# Patient Record
Sex: MALE | Race: WHITE
[De-identification: names, ages, dates, MRNs, and addresses within clinical notes are randomized per-mention and may not be internally consistent; named-entity substitution may affect disease eponyms.]

---

## 2018-06-25 ENCOUNTER — HOSPITAL ENCOUNTER (INPATIENT)
Dept: HOSPITAL 88 - ER | Age: 53
Discharge: HOME | DRG: 690 | End: 2018-06-25
Attending: INTERNAL MEDICINE | Admitting: INTERNAL MEDICINE
Payer: COMMERCIAL

## 2018-06-25 VITALS — WEIGHT: 280 LBS | BODY MASS INDEX: 37.11 KG/M2 | HEIGHT: 73 IN

## 2018-06-25 VITALS — SYSTOLIC BLOOD PRESSURE: 139 MMHG | DIASTOLIC BLOOD PRESSURE: 67 MMHG

## 2018-06-25 VITALS — DIASTOLIC BLOOD PRESSURE: 60 MMHG | SYSTOLIC BLOOD PRESSURE: 132 MMHG

## 2018-06-25 VITALS — DIASTOLIC BLOOD PRESSURE: 67 MMHG | SYSTOLIC BLOOD PRESSURE: 137 MMHG

## 2018-06-25 VITALS — DIASTOLIC BLOOD PRESSURE: 67 MMHG | SYSTOLIC BLOOD PRESSURE: 139 MMHG

## 2018-06-25 DIAGNOSIS — N43.40: ICD-10-CM

## 2018-06-25 DIAGNOSIS — Z28.21: ICD-10-CM

## 2018-06-25 DIAGNOSIS — N13.6: Primary | ICD-10-CM

## 2018-06-25 DIAGNOSIS — I10: ICD-10-CM

## 2018-06-25 DIAGNOSIS — E78.00: ICD-10-CM

## 2018-06-25 DIAGNOSIS — Z86.718: ICD-10-CM

## 2018-06-25 DIAGNOSIS — Z89.612: ICD-10-CM

## 2018-06-25 DIAGNOSIS — G47.30: ICD-10-CM

## 2018-06-25 DIAGNOSIS — K80.20: ICD-10-CM

## 2018-06-25 DIAGNOSIS — Q62.8: ICD-10-CM

## 2018-06-25 LAB
ALBUMIN SERPL-MCNC: 4.4 G/DL (ref 3.5–5)
ALBUMIN/GLOB SERPL: 1.5 {RATIO} (ref 0.8–2)
ALP SERPL-CCNC: 74 IU/L (ref 40–150)
ALT SERPL-CCNC: 44 IU/L (ref 0–55)
AMYLASE SERPL-CCNC: 66 U/L (ref 25–125)
ANION GAP SERPL CALC-SCNC: 13.6 MMOL/L (ref 8–16)
BACTERIA URNS QL MICRO: (no result) /HPF
BASOPHILS # BLD AUTO: 0 10*3/UL (ref 0–0.1)
BASOPHILS NFR BLD AUTO: 0.4 % (ref 0–1)
BILIRUB UR QL: NEGATIVE
BUN SERPL-MCNC: 22 MG/DL (ref 7–26)
BUN/CREAT SERPL: 18 (ref 6–25)
CALCIUM SERPL-MCNC: 9.8 MG/DL (ref 8.4–10.2)
CHLORIDE SERPL-SCNC: 104 MMOL/L (ref 98–107)
CK MB SERPL-MCNC: 2 NG/ML (ref 0–5)
CK MB SERPL-MCNC: 3.5 NG/ML (ref 0–5)
CK SERPL-CCNC: 199 IU/L (ref 30–200)
CK SERPL-CCNC: 343 IU/L (ref 30–200)
CLARITY UR: CLEAR
CO2 SERPL-SCNC: 27 MMOL/L (ref 22–29)
COLOR UR: YELLOW
DEPRECATED APTT PLAS QN: 27.1 SECONDS (ref 23.8–35.5)
DEPRECATED INR PLAS: 1.09
DEPRECATED NEUTROPHILS # BLD AUTO: 7.1 10*3/UL (ref 2.1–6.9)
DEPRECATED RBC URNS MANUAL-ACNC: (no result) /HPF (ref 0–5)
EGFRCR SERPLBLD CKD-EPI 2021: > 60 ML/MIN (ref 60–?)
EOSINOPHIL # BLD AUTO: 0.1 10*3/UL (ref 0–0.4)
EOSINOPHIL NFR BLD AUTO: 1.4 % (ref 0–6)
EPI CELLS URNS QL MICRO: (no result) /LPF
ERYTHROCYTE [DISTWIDTH] IN CORD BLOOD: 12.7 % (ref 11.7–14.4)
GLOBULIN PLAS-MCNC: 2.9 G/DL (ref 2.3–3.5)
GLUCOSE SERPLBLD-MCNC: 140 MG/DL (ref 74–118)
HCT VFR BLD AUTO: 40.9 % (ref 38.2–49.6)
HGB BLD-MCNC: 14.5 G/DL (ref 14–18)
KETONES UR QL STRIP.AUTO: NEGATIVE
LEUKOCYTE ESTERASE UR QL STRIP.AUTO: NEGATIVE
LIPASE SERPL-CCNC: 36 U/L (ref 8–78)
LYMPHOCYTES # BLD: 1.9 10*3/UL (ref 1–3.2)
LYMPHOCYTES NFR BLD AUTO: 19.7 % (ref 18–39.1)
MAGNESIUM SERPL-MCNC: 2 MG/DL (ref 1.3–2.1)
MCH RBC QN AUTO: 29.8 PG (ref 28–32)
MCHC RBC AUTO-ENTMCNC: 35.5 G/DL (ref 31–35)
MCV RBC AUTO: 84.2 FL (ref 81–99)
MONOCYTES # BLD AUTO: 0.7 10*3/UL (ref 0.2–0.8)
MONOCYTES NFR BLD AUTO: 6.7 % (ref 4.4–11.3)
NEUTS SEG NFR BLD AUTO: 71.7 % (ref 38.7–80)
NITRITE UR QL STRIP.AUTO: NEGATIVE
PLATELET # BLD AUTO: 159 X10E3/UL (ref 140–360)
POTASSIUM SERPL-SCNC: 3.6 MMOL/L (ref 3.5–5.1)
PROT UR QL STRIP.AUTO: (no result)
PROTHROMBIN TIME: 13.3 SECONDS (ref 11.9–14.5)
RBC # BLD AUTO: 4.86 X10E6/UL (ref 4.3–5.7)
SODIUM SERPL-SCNC: 141 MMOL/L (ref 136–145)
SP GR UR STRIP: 1.01 (ref 1.01–1.02)
UROBILINOGEN UR STRIP-MCNC: 0.2 MG/DL (ref 0.2–1)
WBC #/AREA URNS HPF: (no result) /HPF (ref 0–5)

## 2018-06-25 PROCEDURE — 36415 COLL VENOUS BLD VENIPUNCTURE: CPT

## 2018-06-25 PROCEDURE — 93005 ELECTROCARDIOGRAM TRACING: CPT

## 2018-06-25 PROCEDURE — 83690 ASSAY OF LIPASE: CPT

## 2018-06-25 PROCEDURE — 84484 ASSAY OF TROPONIN QUANT: CPT

## 2018-06-25 PROCEDURE — 81001 URINALYSIS AUTO W/SCOPE: CPT

## 2018-06-25 PROCEDURE — 85025 COMPLETE CBC W/AUTO DIFF WBC: CPT

## 2018-06-25 PROCEDURE — 82150 ASSAY OF AMYLASE: CPT

## 2018-06-25 PROCEDURE — BT141ZZ FLUOROSCOPY OF KIDNEYS, URETERS AND BLADDER USING LOW OSMOLAR CONTRAST: ICD-10-PCS | Performed by: UROLOGY

## 2018-06-25 PROCEDURE — 80053 COMPREHEN METABOLIC PANEL: CPT

## 2018-06-25 PROCEDURE — 83735 ASSAY OF MAGNESIUM: CPT

## 2018-06-25 PROCEDURE — 71045 X-RAY EXAM CHEST 1 VIEW: CPT

## 2018-06-25 PROCEDURE — 82553 CREATINE MB FRACTION: CPT

## 2018-06-25 PROCEDURE — 74420 UROGRAPHY RTRGR +-KUB: CPT

## 2018-06-25 PROCEDURE — 85730 THROMBOPLASTIN TIME PARTIAL: CPT

## 2018-06-25 PROCEDURE — 74176 CT ABD & PELVIS W/O CONTRAST: CPT

## 2018-06-25 PROCEDURE — 82550 ASSAY OF CK (CPK): CPT

## 2018-06-25 PROCEDURE — 85610 PROTHROMBIN TIME: CPT

## 2018-06-25 PROCEDURE — 0T788DZ DILATION OF BILATERAL URETERS WITH INTRALUMINAL DEVICE, VIA NATURAL OR ARTIFICIAL OPENING ENDOSCOPIC: ICD-10-PCS | Performed by: UROLOGY

## 2018-06-25 PROCEDURE — 99284 EMERGENCY DEPT VISIT MOD MDM: CPT

## 2018-06-25 PROCEDURE — 87086 URINE CULTURE/COLONY COUNT: CPT

## 2018-06-25 RX ADMIN — SODIUM CHLORIDE SCH MLS/HR: 9 INJECTION, SOLUTION INTRAVENOUS at 16:59

## 2018-06-25 RX ADMIN — SODIUM CHLORIDE SCH MLS/HR: 9 INJECTION, SOLUTION INTRAVENOUS at 07:14

## 2018-06-25 NOTE — DIAGNOSTIC IMAGING REPORT
EXAM: CT ABDOMEN/PELVIS WO

DATE: 6/25/2018 3:15 AM  

INDICATION:  Right flank pain, stone protocol

COMPARISON:  None 

TECHNIQUE: The abdomen and pelvis were scanned using a multidetector helical

scanner. Coronal and sagittal reformations were obtained. Routine protocol

performed.

IV Contrast: 0 ml Isovue 300/370



FINDINGS:

Lack of IV contrast decreases sensitivity in evaluating abdominal and pelvic

organs.

LOWER THORAX: No consolidations



LIVER/BILIARY:  No masses.  No ductal dilatation.



GALLBLADDER: Cholelithiasis.

SPLEEN: Unremarkable

PANCREAS: Unremarkable



ADRENALS: No nodules

KIDNEYS: No renal calculi. A 6 to 7 mm distal right ureteral calculus just

above the UVJ results in moderate hydroureteronephrosis. There is marked right

perinephric and periureteral fluid. A 4.8 mm stone is seen in the distal left

ureter without hydronephrosis.



GI TRACT: No evidence of bowel obstruction or appendicitis. Diverticulosis.



VESSELS: A infrarenal IVC filter is noted with extrusion of several struts

outside the expected lumen.

PERITONEUM/RETROPERITONEUM: No free air or fluid

LYMPH NODES: No lymphadenopathy



REPRODUCTIVE ORGANS/BLADDER: Unremarkable

BONES: Postsurgical changes with pubic symphysis and left SI joint ORIF and

partially imaged right femoral hardware.



IMPRESSION:



1. Right distal ureteral calculus (6-7 mm) with moderate hydroureteronephrosis.

The degree of perinephric fluid and inflammation raises the possibility of

calyceal rupture or superimposed infection. 

2. Left distal ureteral calculus (4.8 mm) without hydronephrosis.



Signed by: Dr Suly Williamson MD on 6/25/2018 4:18 AM

## 2018-06-25 NOTE — DISCHARGE SUMMARY
Patient is a 52-year-old male with a past medical history positive for 

hypertension came here with bilateral kidney stones, status post bilateral 

stent placement.  Patient going home today if okay with Dr. Moore, 

urology.  He is to see Dr. Moore in a week.



PHYSICAL EXAMINATION

HEART:  Shows regular rhythm.  Normal S1 and S2 sounds.  

LUNGS:  Clear bilaterally.  

ABDOMEN:  Soft.  

EXTREMITIES:  Shows a prosthesis on the left leg. 

VITALS:  Blood pressure 132/60, temperature is 96.2, heart rate 78 per 

minute, respiratory rate 16 per minute, oxygen saturation 94%.  



FINAL IMPRESSION 

1.  Bilateral kidney stones, status post bilateral stent placement.  

2.  Hypertension.  



PLAN OF TREATMENT:  Patient is going to discharged on Cipro 500 mg twice a 

day for 7-10 days.  Follow up with Dr. Moore, urologist, in a week.  The 

patient is to come to the ER should the symptoms worsen.



 



 _________________________________

RANDI AMADOR MD



DD:  06/25/2018 17:55

DT:  06/25/2018 17:59

Job#:  [id number]
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
## 2018-06-25 NOTE — DIAGNOSTIC IMAGING REPORT
CHEST SINGLE (PORTABLE), 6/25/2018 3:15 AM



Technique: CHEST SINGLE (PORTABLE)

Comparison: None available.

Clinical history: \S\ABD PAIN, N/V \[id number] \S\0338



Findings:

Limited portable technique with overpenetration. Normal cardiomediastinal

silhouette. No consolidation, pleural effusion or pneumothorax.



Impression:

Limited study without acute abnormality.



Signed by: Dr Suly Williamson MD on 6/25/2018 4:07 AM

## 2018-06-25 NOTE — OPERATIVE REPORT
DATE OF PROCEDURE:  June 25, 2018 

 

PREOPERATIVE DIAGNOSIS:

1. Bilateral ureterolithiasis.

2. Right hydroureteronephrosis.

3. Microhematuria.

4. Urinary tract infections.



POSTOPERATIVE DIAGNOSIS:  

1. Bilateral ureterolithiasis.

2. Right hydroureteronephrosis.

3. Microhematuria.

4. Urinary tract infections.



PROCEDURE PERFORMED:   

1. Cystourethroscopy with bilateral ureteral catheterization and 

retrograde ureteropyelography (separate procedure performed for the 

urinary tract infections and microhematuria).

2. Interpretation of retrograde ureteropyelography.

3. Supervision of fluoroscopy.  No radiologist present.

4. Cystourethroscopy with insertion of bilateral indwelling ureteral 

stents (separate procedure performed for the stones and hydronephrosis).



ANESTHESIA:  General.



COMPLICATIONS:  None.



CLINICAL SUMMARY:  Khoa Vaelncia is a 52-year-old man with the above 

preoperative diagnoses.  He is brought for the above procedures.  He is 

aware of the risks of bleeding, infection, injury to adjacent structures, 

need for additional procedures and elected to proceed.  He also understands 

he will have temporary indwelling ureteral stents that require followup and 

removal.  He understood all these risks and elected to proceed.



OPERATIVE PROCEDURE IN DETAIL:  Informed consent was verified.  Khoa Valencia was properly identified, taken to the operating room and placed on 

the cystoscopy table in supine position.  Anesthesia was uneventfully 

begun.  Patient was then carefully and gently repositioned in the dorsal 

lithotomy position with all pressure points well padded.  His genitalia 

were prepared and draped in the usual sterile fashion.  A 22.5-French 

cystoscope sheath with the visual obturator in place was atraumatically 

inserted into patient's urethra.  It was guided down the unremarkable 

urethra through the normal sphincteric region through the prostate bed, 

which was significant for very early BPH without any significant visual 

obstruction.  We entered the patient's bladder where panendoscopy revealed 

minimal trabeculations but no tumors, no stones and no diverticula.  

Normally positioned and configured ureteral orifices were identified.  A 

5-French open-ended catheter was inserted into the patient's right ureter.  

Retrograde ureteropyelograms were performed.  



We then negotiated a guidewire into the more proximal portion of the right 

ureter past the open-ended catheter.  We obtained a clear hydronephrotic 

drip.  We injected contrast.  Then with cystoscopic and fluoroscopic 

guidance, a right-sided indwelling ureteral stent was then placed.  It was 

coiled in the patient's left kidney as well as the patient's bladder.  The 

retaining suture was cut short.  



An identical maneuver was performed on the left-hand side.  



Interpretation of retrograde ureteropyelography:  Contrast was instilled in 

retrograde fashion bilaterally.  There was a very large stone in the distal 

right ureter with extremely severe hydroureteronephrosis and a bifid 

collecting system.  The stent was in good position coiled in the patient's 

upper pole neal as well as the patient's bladder at the end of the case.  

On the left-hand side, there was no hydronephrosis.  I really could not 

visualize well the ureter on today's study to determine whether or not the 

calcification noted on CT is a stone.  The stent on the left-hand side was 

coiled in the patient's renal pelvis as well as the patient's bladder.  



The stents utilized in the patient were a 6-French x 30 cm on the right and 

a 6-French x 26 cm on the left.  



The patient's bladder was then drained.  The cystoscope was withdrawn.  A 

belladonna and opium suppository was placed, revealing a 25-gram prostate, 

smooth, non-fluctuant, without any nodules.  Patient was uneventfully 

reversed from anesthesia and taken to the recovery room in stable 

condition.  There were no complications to the procedure.  Patient 

tolerated the procedure well.  Explicit postoperative instructions were 

given, and we will reevaluate the patient tomorrow and hopefully discharge 

him home.  Plans will be to return the patient to the operating room in 

several weeks to remove his stent, perform bilateral ureteroscopy and laser 

the stone as needed. 











DD:  06/25/2018 14:20

DT:  06/25/2018 14:29

Job#:  [id number]